# Patient Record
Sex: FEMALE | Race: ASIAN | NOT HISPANIC OR LATINO | URBAN - METROPOLITAN AREA
[De-identification: names, ages, dates, MRNs, and addresses within clinical notes are randomized per-mention and may not be internally consistent; named-entity substitution may affect disease eponyms.]

---

## 2024-04-14 ENCOUNTER — EMERGENCY (EMERGENCY)
Facility: HOSPITAL | Age: 89
LOS: 1 days | Discharge: HOME | End: 2024-04-14
Attending: EMERGENCY MEDICINE
Payer: COMMERCIAL

## 2024-04-14 VITALS
HEART RATE: 72 BPM | OXYGEN SATURATION: 95 % | TEMPERATURE: 98 F | WEIGHT: 100.09 LBS | RESPIRATION RATE: 18 BRPM | SYSTOLIC BLOOD PRESSURE: 135 MMHG | HEIGHT: 61 IN | DIASTOLIC BLOOD PRESSURE: 79 MMHG

## 2024-04-14 PROCEDURE — 70486 CT MAXILLOFACIAL W/O DYE: CPT | Mod: 26,MC

## 2024-04-14 PROCEDURE — 70486 CT MAXILLOFACIAL W/O DYE: CPT | Mod: MC

## 2024-04-14 PROCEDURE — 99284 EMERGENCY DEPT VISIT MOD MDM: CPT | Mod: 25

## 2024-04-14 PROCEDURE — 70450 CT HEAD/BRAIN W/O DYE: CPT | Mod: 26,MC

## 2024-04-14 PROCEDURE — 99284 EMERGENCY DEPT VISIT MOD MDM: CPT

## 2024-04-14 PROCEDURE — 70450 CT HEAD/BRAIN W/O DYE: CPT | Mod: MC

## 2024-04-14 NOTE — ED PROVIDER NOTE - DATE/TIME 1
CARDIOLOGY NOTE      2/5/2024    RE: Marla Ordaz  (1941)                               TO:  Sumit Tamez MD            CHIEF COMPLAINT   Marla is a 82 y.o. female who was seen today for management of coronary artery disease  Here for follow-up                                     HPI:                   Pt has h/o coronary disease, atrial fibrillation, prior pacemaker implantation, orthostatic hypotension, seen today for  FU  Marla Ordaz has the following history recorded in care path:  Patient Active Problem List    Diagnosis Date Noted    Pacemaker 12/14/2022    Hypopotassemia 11/09/2022    Closed T8 spinal fracture with cord injury, initial encounter ([facility]) 09/26/2023    LILLIAN (acute kidney injury) ([facility]) 03/23/2023    Closed wedge compression fracture of first lumbar vertebra ([facility]) 10/13/2021    Paroxysmal A-fib ([facility]) 06/03/2021    Other proteinuria 11/10/2020    Localized edema 11/10/2020    Orthostatic hypotension 11/10/2020    Chest pain due to CAD ([facility]) 02/07/2019    Chest pain 02/05/2019    Pyelonephritis 04/05/2018    Trigeminal neuralgia pain 03/08/2017    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/04/2015    H/O cardiac pacemaker 08/04/2015    Hearing loss in left ear 08/04/2015    HTN (hypertension) 01/21/2015    Depression with anxiety 01/21/2015    Hypothyroidism 01/21/2015    Neck pain 01/21/2015    Hyperlipidemia 01/21/2015    Bradycardia 02/28/2012    Syncope 02/28/2012    Mallet finger 06/16/2011    Intertrochanteric fracture of left femur ([facility]) 03/29/2011     Current Outpatient Medications   Medication Sig Dispense Refill    meclizine (ANTIVERT) 25 MG tablet Take 1 tablet by mouth 3 times daily as needed for Dizziness 90 tablet 0    traZODone (DESYREL) 150 MG tablet Take 1 tablet by mouth nightly 90 tablet 1    clobetasol (TEMOVATE) 0.05 % external solution Apply topically 2 times daily. 50 mL 2    levothyroxine (SYNTHROID) 75 MCG tablet TAKE 1 TABLET DAILY. 90 tablet 1    rosuvastatin 
14-Apr-2024 17:24

## 2024-04-14 NOTE — ED ADULT NURSE NOTE - NSFALLHARMRISKINTERV_ED_ALL_ED

## 2024-04-14 NOTE — ED PROVIDER NOTE - PROGRESS NOTE DETAILS
suarez: pt ambulatory. ct shows nasal bone fx. precautions given. dc with family suarez: pt ambulatory. ct shows nasal bone fx. precautions given. dc with family. ct head- extracranial frontal hematoma

## 2024-04-14 NOTE — ED PROVIDER NOTE - NSFOLLOWUPINSTRUCTIONS_ED_ALL_ED_FT
bacitracin to area 2x/day, keep area dry/clean.  monitor for infection (redness/pus,worsening pain, increase swelling).  ok to wash after 1 day running soap and water.  wound check in 3 days and suture removal in 7 days.    nasal bone fracture- avoid blowing nose. ok to ice. see ENT if worsens. bacitracin to area 2x/day, keep area dry/clean.  monitor for infection (redness/pus,worsening pain, increase swelling).  ok to wash after 1 day running soap and water.      nasal bone fracture- avoid blowing nose. ok to ice. see ENT if worsens.

## 2024-04-14 NOTE — ED ADULT TRIAGE NOTE - CHIEF COMPLAINT QUOTE
BIB EMS S/P trip and fall today, landed on her face sustained. bruise to left forehead, laceration to left nose noted, denies LOC, dizziness, stated on baby Aspirin daily.

## 2024-04-14 NOTE — ED PROVIDER NOTE - CLINICAL SUMMARY MEDICAL DECISION MAKING FREE TEXT BOX
88-year-old female history of diabetes and hypertension presents to ED following mechanical fall with facial injury.  Wound repaired in urgent care.  Patient here for CT scan of face and head.  Will scan and reassess

## 2024-04-14 NOTE — ED PROVIDER NOTE - OBJECTIVE STATEMENT
88-year-old female history of diabetes, hypertension presents to ED following mechanical fall.  As per patient she was walking with her walker and tripped over a rug.  Patient fell down hitting her face and hands.  No LOC.  Patient was able to get up with the help of people around her.  Patient went to an urgent care where facial laceration was repaired.  Patient was advised to come to the ED for imaging.

## 2024-04-14 NOTE — ED ADULT NURSE NOTE - OBJECTIVE STATEMENT
Pt brought in by family for a witnessed fall outside of the house. Pt states she tripped on a pavement while walking with the walker. Pt has a knot on the forehead and a scar from the fall on her left side of her nose. Pt denies LOC. Ot denies any pain to the face.

## 2024-04-14 NOTE — ED PROVIDER NOTE - PATIENT PORTAL LINK FT
You can access the FollowMyHealth Patient Portal offered by [facility] by registering at the following website: [email or web address]. By joining [facility]’s FollowMyHealth portal, you will also be able to view your health information using other applications (apps) compatible with our system.